# Patient Record
(demographics unavailable — no encounter records)

---
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
## 2024-10-23 NOTE — PHYSICAL EXAM
[Well Developed] : well developed [Normal Venous Pressure] : normal venous pressure [No Carotid Bruit] : no carotid bruit [Clear Lung Fields] : clear lung fields [Soft] : abdomen soft [No Edema] : no edema [No Focal Deficits] : no focal deficits [de-identified] : 1-2/6 midsys murmur

## 2025-02-26 NOTE — ASSESSMENT
[FreeTextEntry1] : ------------------------------------------------------ ECG - NSR, rate 65, axis +30, borderline low voltage  Same meds RTO 3 mos.

## 2025-02-26 NOTE — PHYSICAL EXAM
[Well Developed] : well developed [Normal Venous Pressure] : normal venous pressure [No Carotid Bruit] : no carotid bruit [Normal S1, S2] : normal S1, S2 [Clear Lung Fields] : clear lung fields [Soft] : abdomen soft [No Edema] : no edema [No Focal Deficits] : no focal deficits [de-identified] : 1-2/6 midsys murmur
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
## 2025-05-21 NOTE — PHYSICAL EXAM
[Normal Venous Pressure] : normal venous pressure [No Carotid Bruit] : no carotid bruit [Normal S1, S2] : normal S1, S2 [Clear Lung Fields] : clear lung fields [Soft] : abdomen soft [No Edema] : no edema [No Focal Deficits] : no focal deficits [de-identified] : comfortable [de-identified] : 2/6 sys murmur